# Patient Record
Sex: FEMALE | Race: WHITE
[De-identification: names, ages, dates, MRNs, and addresses within clinical notes are randomized per-mention and may not be internally consistent; named-entity substitution may affect disease eponyms.]

---

## 2021-07-02 ENCOUNTER — HOSPITAL ENCOUNTER (EMERGENCY)
Dept: HOSPITAL 26 - MED | Age: 61
LOS: 1 days | Discharge: HOME | End: 2021-07-03
Payer: COMMERCIAL

## 2021-07-02 VITALS — HEIGHT: 61 IN | WEIGHT: 165 LBS | BODY MASS INDEX: 31.15 KG/M2

## 2021-07-02 VITALS — DIASTOLIC BLOOD PRESSURE: 84 MMHG | SYSTOLIC BLOOD PRESSURE: 146 MMHG
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
## 2021-07-02 DIAGNOSIS — Z20.822: ICD-10-CM

## 2021-07-02 DIAGNOSIS — I10: ICD-10-CM

## 2021-07-02 DIAGNOSIS — T65.891A: Primary | ICD-10-CM

## 2021-07-02 DIAGNOSIS — R11.2: ICD-10-CM

## 2021-07-02 DIAGNOSIS — E86.0: ICD-10-CM

## 2021-07-02 LAB
ALBUMIN FLD-MCNC: 4.1 G/DL (ref 3.4–5)
ANION GAP SERPL CALCULATED.3IONS-SCNC: 11.7 MMOL/L (ref 8–16)
ANION GAP SERPL CALCULATED.3IONS-SCNC: 9.3 MMOL/L (ref 8–16)
APAP SERPL-MCNC: < 0.5 UG/ML (ref 10–30)
APPEARANCE UR: (no result)
AST SERPL-CCNC: 20 U/L (ref 15–37)
BARBITURATES UR QL SCN: NEGATIVE NG/ML
BASOPHILS # BLD AUTO: 0 K/UL (ref 0–0.22)
BASOPHILS NFR BLD AUTO: 0.2 % (ref 0–2)
BENZODIAZ UR QL SCN: NEGATIVE NG/ML
BILIRUB SERPL-MCNC: 0.2 MG/DL (ref 0–1)
BILIRUB UR QL STRIP: NEGATIVE
BUN SERPL-MCNC: 10 MG/DL (ref 7–18)
BUN SERPL-MCNC: 7 MG/DL (ref 7–18)
BZE UR QL SCN: NEGATIVE NG/ML
CANNABINOIDS UR QL SCN: NEGATIVE NG/ML
CHLORIDE SERPL-SCNC: 104 MMOL/L (ref 98–107)
CHLORIDE SERPL-SCNC: 107 MMOL/L (ref 98–107)
CO2 SERPL-SCNC: 27.7 MMOL/L (ref 21–32)
CO2 SERPL-SCNC: 28.5 MMOL/L (ref 21–32)
COLOR UR: YELLOW
CREAT SERPL-MCNC: 0.5 MG/DL (ref 0.6–1.3)
CREAT SERPL-MCNC: 0.6 MG/DL (ref 0.6–1.3)
EOSINOPHIL # BLD AUTO: 0.1 K/UL (ref 0–0.4)
EOSINOPHIL NFR BLD AUTO: 1.1 % (ref 0–4)
ERYTHROCYTE [DISTWIDTH] IN BLOOD BY AUTOMATED COUNT: 12.9 % (ref 11.6–13.7)
GFR SERPL CREATININE-BSD FRML MDRD: 132 ML/MIN (ref 90–?)
GFR SERPL CREATININE-BSD FRML MDRD: 162 ML/MIN (ref 90–?)
GLUCOSE SERPL-MCNC: 136 MG/DL (ref 74–106)
GLUCOSE SERPL-MCNC: 148 MG/DL (ref 74–106)
GLUCOSE UR STRIP-MCNC: NEGATIVE MG/DL
HCT VFR BLD AUTO: 41.3 % (ref 36–48)
HGB BLD-MCNC: 13.5 G/DL (ref 12–16)
HGB UR QL STRIP: NEGATIVE
LEUKOCYTE ESTERASE UR QL STRIP: (no result)
LYMPHOCYTES # BLD AUTO: 1.9 K/UL (ref 2.5–16.5)
LYMPHOCYTES NFR BLD AUTO: 17.4 % (ref 20.5–51.1)
MCH RBC QN AUTO: 29 PG (ref 27–31)
MCHC RBC AUTO-ENTMCNC: 33 G/DL (ref 33–37)
MCV RBC AUTO: 88.2 FL (ref 80–94)
MONOCYTES # BLD AUTO: 0.5 K/UL (ref 0.8–1)
MONOCYTES NFR BLD AUTO: 4.4 % (ref 1.7–9.3)
NEUTROPHILS # BLD AUTO: 8.5 K/UL (ref 1.8–7.7)
NEUTROPHILS NFR BLD AUTO: 76.9 % (ref 42.2–75.2)
NITRITE UR QL STRIP: NEGATIVE
OPIATES UR QL SCN: NEGATIVE NG/ML
PCP UR QL SCN: NEGATIVE NG/ML
PH UR STRIP: 5.5 [PH] (ref 5–9)
PLATELET # BLD AUTO: 326 K/UL (ref 140–450)
POTASSIUM SERPL-SCNC: 3.4 MMOL/L (ref 3.5–5.1)
POTASSIUM SERPL-SCNC: 3.8 MMOL/L (ref 3.5–5.1)
PROTHROMBIN TIME: 9.5 SECS (ref 10.8–13.4)
RBC # BLD AUTO: 4.68 MIL/UL (ref 4.2–5.4)
RBC #/AREA URNS HPF: (no result) /HPF (ref 0–5)
SALICYLATES SERPL-MCNC: 3.5 MG/DL (ref 2.8–20)
SODIUM SERPL-SCNC: 140 MMOL/L (ref 136–145)
SODIUM SERPL-SCNC: 141 MMOL/L (ref 136–145)
WBC # BLD AUTO: 11.1 K/UL (ref 4.8–10.8)
WBC,URINE: (no result) /HPF (ref 0–5)

## 2021-07-02 PROCEDURE — 36600 WITHDRAWAL OF ARTERIAL BLOOD: CPT

## 2021-07-02 PROCEDURE — 87086 URINE CULTURE/COLONY COUNT: CPT

## 2021-07-02 PROCEDURE — 85610 PROTHROMBIN TIME: CPT

## 2021-07-02 PROCEDURE — 96374 THER/PROPH/DIAG INJ IV PUSH: CPT

## 2021-07-02 PROCEDURE — 87040 BLOOD CULTURE FOR BACTERIA: CPT

## 2021-07-02 PROCEDURE — 80053 COMPREHEN METABOLIC PANEL: CPT

## 2021-07-02 PROCEDURE — 80048 BASIC METABOLIC PNL TOTAL CA: CPT

## 2021-07-02 PROCEDURE — 99291 CRITICAL CARE FIRST HOUR: CPT

## 2021-07-02 PROCEDURE — 96361 HYDRATE IV INFUSION ADD-ON: CPT

## 2021-07-02 PROCEDURE — 36415 COLL VENOUS BLD VENIPUNCTURE: CPT

## 2021-07-02 PROCEDURE — G0482 DRUG TEST DEF 15-21 CLASSES: HCPCS

## 2021-07-02 PROCEDURE — 80305 DRUG TEST PRSMV DIR OPT OBS: CPT

## 2021-07-02 PROCEDURE — 85730 THROMBOPLASTIN TIME PARTIAL: CPT

## 2021-07-02 PROCEDURE — 85025 COMPLETE CBC W/AUTO DIFF WBC: CPT

## 2021-07-02 PROCEDURE — 82803 BLOOD GASES ANY COMBINATION: CPT

## 2021-07-02 PROCEDURE — 93005 ELECTROCARDIOGRAM TRACING: CPT

## 2021-07-02 PROCEDURE — 81001 URINALYSIS AUTO W/SCOPE: CPT

## 2021-07-02 PROCEDURE — G0480 DRUG TEST DEF 1-7 CLASSES: HCPCS

## 2021-07-02 PROCEDURE — 87426 SARSCOV CORONAVIRUS AG IA: CPT

## 2021-07-02 NOTE — NUR
Spoke with Swathi from poison control. Recommends 6hr observation, UA, ekg, blood 
work. Atropine if hr drops or symptomatic bradycardia. Needs family to please 
get more info and call back with information.



Common symptoms N/V/D, skin irritation from oral exposure 3-5 days after 
ingestion, hypotension, seizures, lethargy, and metabolic acidosis. Uncommon 
cholinergic toxidrome symtoms: sweating, severe diarrhea, bradycardia, 
bronchospasms.

## 2021-07-02 NOTE — NUR
61 Y/O FEMALE BIB DAUGHTER W/C ASSISTED C/O WEAKNESS, DIZZINESS, FACIAL 
NUMBNESS DESCRIBES AS "STINGING" S/P PT ACCIDENTALY INGESTED [facility] POISON 
INSTEAD OF PROTEIN POWDER FOR HER SMOOTHIE X2HRS AGO. PT STATES +N, VOMITED 1 
TIME GREEN BILE WITH 2 EPISODES OF DIARRHEA. DENIES FEVER/CHILLS. DENIES PAIN. 
ABDOMEN IS SOFT, ROUND, BOWEL SOUNDS ACTIVE X4 LAST BM 7/2/21.



PMH: HTN



NKA

## 2021-07-03 VITALS — DIASTOLIC BLOOD PRESSURE: 67 MMHG | SYSTOLIC BLOOD PRESSURE: 114 MMHG

## 2021-07-03 NOTE — NUR
Patient discharged with v/s stable. Written and verbal after care instructions 
given and explained. 

Patient alert, oriented and verbalized understanding of instructions. 
Ambulatory with steady gait. All questions addressed prior to discharge. ID 
band removed. Patient advised to follow up with PMD. Rx of PEPCID AND ZOFRAN 
given. Patient educated on indication of medication including possible reaction 
and side effects. Opportunity to ask questions provided and answered.